# Patient Record
Sex: FEMALE | NOT HISPANIC OR LATINO | URBAN - METROPOLITAN AREA
[De-identification: names, ages, dates, MRNs, and addresses within clinical notes are randomized per-mention and may not be internally consistent; named-entity substitution may affect disease eponyms.]

---

## 2017-04-08 ENCOUNTER — EMERGENCY (EMERGENCY)
Facility: HOSPITAL | Age: 52
LOS: 1 days | Discharge: HOME | End: 2017-04-08
Attending: EMERGENCY MEDICINE | Admitting: EMERGENCY MEDICINE
Payer: COMMERCIAL

## 2017-04-08 VITALS
RESPIRATION RATE: 16 BRPM | SYSTOLIC BLOOD PRESSURE: 125 MMHG | HEIGHT: 66 IN | TEMPERATURE: 98 F | WEIGHT: 145.06 LBS | OXYGEN SATURATION: 99 % | DIASTOLIC BLOOD PRESSURE: 85 MMHG | HEART RATE: 58 BPM
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
## 2017-04-08 DIAGNOSIS — M79.644 PAIN IN RIGHT FINGER(S): ICD-10-CM

## 2017-04-08 DIAGNOSIS — Z98.890 OTHER SPECIFIED POSTPROCEDURAL STATES: ICD-10-CM
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
## 2017-04-08 DIAGNOSIS — S62.616A DISPLACED FRACTURE OF PROXIMAL PHALANX OF RIGHT LITTLE FINGER, INITIAL ENCOUNTER FOR CLOSED FRACTURE: ICD-10-CM
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
## 2017-04-08 DIAGNOSIS — Z98.890 OTHER SPECIFIED POSTPROCEDURAL STATES: Chronic | ICD-10-CM

## 2017-04-08 PROCEDURE — 99283 EMERGENCY DEPT VISIT LOW MDM: CPT | Mod: 25

## 2017-04-08 PROCEDURE — 26720 TREAT FINGER FRACTURE EACH: CPT | Mod: F9

## 2017-04-08 PROCEDURE — 26720 TREAT FINGER FRACTURE EACH: CPT | Mod: 54

## 2017-04-08 NOTE — ED ADULT NURSE NOTE - OBJECTIVE STATEMENT
51F came to ED c/o right 5th finger pain secondary to jamming finger between table and floor 2 days ago. Patient has pain 5/10 worse with movement and palpation. Pain radiates up the arm. Patient went to walk in clinic to have imaging done. Patient denies SOB/chest pain/N/V/D/dizziness/abdominal pain/head pain. Patient reports no other injuries. No head/neck/back pain. Patient states finger is numb. Right fifth finger is swollen and bruised. Painful movement, but +pulse/motor/sensory all 4 extremities. Patient splinted in ED. Patient did not take any OTC medications.

## 2017-04-08 NOTE — ED ADULT TRIAGE NOTE - CHIEF COMPLAINT QUOTE
injured right little finger while sliding on the floor two days ago; [place] [facility] [place] here for ortho

## 2017-04-08 NOTE — ED PROVIDER NOTE - OBJECTIVE STATEMENT
50yo female with steady gait, no PMHx c/o right dominant finger fx after fall, hyperextended injury 2days ago.+ Seen by PMD today and + finger fracture in x-ray. Denies LOC or other injuries. Denies sensory changes or weakness to fingers.

## 2017-04-08 NOTE — ED PROVIDER NOTE - MEDICAL DECISION MAKING DETAILS
Esther: 50 yo F with no PMHx c/o right dominant finger pain and swelling after mechanical fall with hyperextended injury 2 days ago. Seen by PMD today and + finger fracture in x-ray. Denies LOC or other injuries. Denies sensory changes or weakness to fingers.  -will apply finger splint after xrays were seen and will recommend outpt follow up with hand specialist

## 2017-04-08 NOTE — ED PROVIDER NOTE - CONDUCTED A DETAILED DISCUSSION WITH PATIENT AND/OR GUARDIAN REGARDING, MDM
need for outpatient follow-up/pt brought x-ray cd./return to ED if symptoms worsen, persist or questions arise/radiology results

## 2017-04-08 NOTE — ED ADULT NURSE NOTE - CHIEF COMPLAINT QUOTE
injured right little finger while sliding on the floor two days ago; [place] [facility] [place]; [place] here for ortho

## 2017-04-08 NOTE — ED PROVIDER NOTE - PHYSICAL EXAMINATION
NAD, VSS, Afebrile, No spinal tender, + right 5th phalanx, PIP tender, with generalized swelling/ ecchymosis, diminished ROMs, N/V- intact.